# Patient Record
Sex: MALE | Race: ASIAN | NOT HISPANIC OR LATINO | Employment: FULL TIME
[De-identification: names, ages, dates, MRNs, and addresses within clinical notes are randomized per-mention and may not be internally consistent; named-entity substitution may affect disease eponyms.]

---

## 2017-10-16 ENCOUNTER — MYAURORA ACCOUNT LINK (OUTPATIENT)
Dept: OTHER | Age: 42
End: 2017-10-16

## 2017-10-16 ENCOUNTER — PRIOR ORIGINAL RECORDS (OUTPATIENT)
Dept: OTHER | Age: 42
End: 2017-10-16

## 2017-10-27 ENCOUNTER — PRIOR ORIGINAL RECORDS (OUTPATIENT)
Dept: OTHER | Age: 42
End: 2017-10-27

## 2017-11-01 ENCOUNTER — PRIOR ORIGINAL RECORDS (OUTPATIENT)
Dept: OTHER | Age: 42
End: 2017-11-01

## 2017-11-01 LAB
ALT (SGPT): 31 U/L
AST (SGOT): 22 U/L
CHOLESTEROL, TOTAL: 150 MG/DL
GLUCOSE: 2.6 MG/DL
HDL CHOLESTEROL: 57 MG/DL
LDL CHOLESTEROL: 61 MG/DL
NON-HDL CHOLESTEROL: 93 MG/DL
TRIGLYCERIDES: 162 MG/DL

## 2017-11-28 ENCOUNTER — PRIOR ORIGINAL RECORDS (OUTPATIENT)
Dept: OTHER | Age: 42
End: 2017-11-28

## 2018-01-25 ENCOUNTER — PRIOR ORIGINAL RECORDS (OUTPATIENT)
Dept: OTHER | Age: 43
End: 2018-01-25

## 2018-10-10 ENCOUNTER — OFFICE VISIT (OUTPATIENT)
Dept: FAMILY MEDICINE CLINIC | Facility: CLINIC | Age: 43
End: 2018-10-10
Payer: COMMERCIAL

## 2018-10-10 VITALS
OXYGEN SATURATION: 99 % | TEMPERATURE: 98 F | HEIGHT: 67 IN | BODY MASS INDEX: 40.81 KG/M2 | SYSTOLIC BLOOD PRESSURE: 118 MMHG | RESPIRATION RATE: 16 BRPM | WEIGHT: 260 LBS | DIASTOLIC BLOOD PRESSURE: 80 MMHG | HEART RATE: 81 BPM

## 2018-10-10 DIAGNOSIS — R09.89 CHEST CONGESTION: ICD-10-CM

## 2018-10-10 DIAGNOSIS — H65.191 OTHER ACUTE NONSUPPURATIVE OTITIS MEDIA OF RIGHT EAR, RECURRENCE NOT SPECIFIED: ICD-10-CM

## 2018-10-10 DIAGNOSIS — J01.10 ACUTE NON-RECURRENT FRONTAL SINUSITIS: Primary | ICD-10-CM

## 2018-10-10 PROCEDURE — 99202 OFFICE O/P NEW SF 15 MIN: CPT | Performed by: NURSE PRACTITIONER

## 2018-10-10 RX ORDER — AMOXICILLIN AND CLAVULANATE POTASSIUM 875; 125 MG/1; MG/1
1 TABLET, FILM COATED ORAL 2 TIMES DAILY
Qty: 20 TABLET | Refills: 0 | Status: SHIPPED | OUTPATIENT
Start: 2018-10-10 | End: 2018-10-20

## 2018-10-10 RX ORDER — METOPROLOL TARTRATE 100 MG/1
100 TABLET ORAL DAILY
COMMUNITY

## 2018-10-10 RX ORDER — EZETIMIBE 10 MG/1
1 TABLET ORAL DAILY
COMMUNITY

## 2018-10-10 RX ORDER — FLUTICASONE PROPIONATE 50 MCG
2 SPRAY, SUSPENSION (ML) NASAL DAILY
Qty: 1 BOTTLE | Refills: 1 | Status: SHIPPED | OUTPATIENT
Start: 2018-10-10 | End: 2018-10-24

## 2018-10-10 RX ORDER — LOSARTAN POTASSIUM 25 MG/1
TABLET ORAL DAILY
COMMUNITY

## 2018-10-10 RX ORDER — ATORVASTATIN CALCIUM 20 MG/1
1 TABLET, FILM COATED ORAL DAILY
COMMUNITY

## 2018-10-10 NOTE — PROGRESS NOTES
CHIEF COMPLAINT:   Patient presents with:  Cough: wet  Chest Congestion: runny nose, sinus congestion,s/s for 4-5 days.   OTC Sudafed/Tylenol was used      HPI:   Susan Vegas is a 39year old male who presents for cough and sinus congestion for 5 day /80   Pulse 81   Temp 97.7 °F (36.5 °C) (Oral)   Resp 16   Ht 67\"   Wt 260 lb   SpO2 99%   BMI 40.72 kg/m²   GENERAL: well developed, well nourished,in no apparent distress  SKIN: no rashes,no suspicious lesions  HEAD: atraumatic, normocephalic,  + -   Increase oral fluids to loosen and thin secretions, eat a nutritious diet  -   Tylenol or ibuprofen for pain as packet insert   -   Robitussin DM, Mucinex DM, or generic equivalent for cough as packet insert  -   Return to clinic if symptoms persist or You have an infection of the middle ear, the space behind the eardrum. This is also called acute otitis media (AOM). Sometimes it is caused by the common cold.  This is because congestion can block the internal passage (eustachian tube) that drains fluid fr The sinuses are air-filled spaces within the bones of the face. They connect to the inside of the nose. Sinusitis is an inflammation of the tissue that lines the sinuses. Sinusitis can occur during a cold.  It can also happen due to allergies to pollens and · Do not use nasal rinses or irrigation during an acute sinus infection, unless your healthcare provider tells you to. Rinsing may spread the infection to other areas in your sinuses.   · Use acetaminophen or ibuprofen to control pain, unless another pain m

## 2018-10-10 NOTE — PATIENT INSTRUCTIONS
-   Increase oral fluids to loosen and thin secretions, eat a nutritious diet  -   Tylenol or ibuprofen for pain as packet insert   -   Robitussin DM, Mucinex DM, or generic equivalent for cough as packet insert  -   Return to clinic if symptoms persist or (eustachian tube) that drains fluid from the middle ear. When the middle ear fills with fluid, bacteria can grow there and cause an infection. Oral antibiotics are used to treat this illness, not ear drops.  Symptoms usually start to improve within 1 to 2 d other particles in the air. Sinusitis can cause symptoms of sinus congestion and a feeling of fullness. A sinus infection causes fever, headache, and facial pain.  There is often green or yellow fluid draining from the nose or into the back of the throat (p liver or kidney disease or ever had a stomach ulcer, talk with your healthcare provider before using these medicines. (Aspirin should never be taken by anyone under age 25 who is ill with a fever. It may cause severe liver damage.)  · Don't smoke.  This can

## 2018-10-30 ENCOUNTER — MYAURORA ACCOUNT LINK (OUTPATIENT)
Dept: OTHER | Age: 43
End: 2018-10-30

## 2018-10-30 ENCOUNTER — PRIOR ORIGINAL RECORDS (OUTPATIENT)
Dept: OTHER | Age: 43
End: 2018-10-30

## 2018-11-21 ENCOUNTER — PRIOR ORIGINAL RECORDS (OUTPATIENT)
Dept: OTHER | Age: 43
End: 2018-11-21

## 2018-11-21 LAB
ALT (SGPT): 72 U/L
AST (SGOT): 44 U/L
CHOLESTEROL, TOTAL: 122 MG/DL
CHOLESTEROL, TOTAL: 122 MG/DL
HDL CHOLESTEROL: 51 MG/DL
HDL CHOLESTEROL: 51 MG/DL
LDL CHOLESTEROL: 40 MG/DL
LDL CHOLESTEROL: 40 MG/DL
NON-HDL CHOLESTEROL: 71 MG/DL
TOTAL CHOLESTEROL / HDL RATIO: 2.4 RATIO UN
TRIGLYCERIDES: 155 MG/DL
TRIGLYCERIDES: 155 MG/DL

## 2018-11-23 ENCOUNTER — PRIOR ORIGINAL RECORDS (OUTPATIENT)
Dept: OTHER | Age: 43
End: 2018-11-23

## 2019-02-28 VITALS
WEIGHT: 253 LBS | HEART RATE: 72 BPM | DIASTOLIC BLOOD PRESSURE: 84 MMHG | HEIGHT: 68 IN | RESPIRATION RATE: 16 BRPM | SYSTOLIC BLOOD PRESSURE: 130 MMHG | BODY MASS INDEX: 38.34 KG/M2

## 2019-02-28 VITALS
RESPIRATION RATE: 16 BRPM | DIASTOLIC BLOOD PRESSURE: 80 MMHG | HEART RATE: 72 BPM | BODY MASS INDEX: 39.4 KG/M2 | HEIGHT: 68 IN | SYSTOLIC BLOOD PRESSURE: 120 MMHG | WEIGHT: 260 LBS

## 2019-04-16 RX ORDER — LOSARTAN POTASSIUM 25 MG/1
TABLET ORAL
COMMUNITY
Start: 2019-02-21 | End: 2020-01-13 | Stop reason: SDUPTHER

## 2019-04-16 RX ORDER — ATORVASTATIN CALCIUM 40 MG/1
TABLET, FILM COATED ORAL
COMMUNITY
Start: 2019-02-21 | End: 2020-01-13 | Stop reason: SDUPTHER

## 2019-04-16 RX ORDER — EZETIMIBE 10 MG/1
TABLET ORAL
COMMUNITY
Start: 2018-03-08 | End: 2020-01-13 | Stop reason: SDUPTHER

## 2019-04-16 RX ORDER — METOPROLOL SUCCINATE 100 MG/1
TABLET, EXTENDED RELEASE ORAL
COMMUNITY
Start: 2018-03-08 | End: 2020-01-13 | Stop reason: SDUPTHER

## 2019-08-21 LAB
ABSOLUTE IMMATURE GRANULOCYTES (OFFPRE24): NORMAL
ALBUMIN SERPL-MCNC: 4.7 G/DL
ALBUMIN/GLOB SERPL: NORMAL {RATIO}
ALP SERPL-CCNC: 28 U/L
ALT SERPL-CCNC: 23 U/L
ANION GAP SERPL CALC-SCNC: NORMAL MMOL/L
AST SERPL-CCNC: 20 U/L
BASO+EOS+MONOS # BLD: NORMAL 10*3/UL
BASO+EOS+MONOS NFR BLD: NORMAL %
BASOPHILS # BLD: NORMAL 10*3/UL
BASOPHILS NFR BLD: NORMAL %
BILIRUB SERPL-MCNC: 1.2 MG/DL
BUN SERPL-MCNC: 14 MG/DL
BUN/CREAT SERPL: NORMAL
CALCIUM SERPL-MCNC: 9.3 MG/DL
CHLORIDE SERPL-SCNC: 100 MMOL/L
CHOLEST SERPL-MCNC: 138 MG/DL
CHOLEST/HDLC SERPL: 2.3 {RATIO}
CO2 SERPL-SCNC: NORMAL MMOL/L
CREAT SERPL-MCNC: 1 MG/DL
DIFFERENTIAL METHOD BLD: NORMAL
EOSINOPHIL # BLD: NORMAL 10*3/UL
EOSINOPHIL NFR BLD: NORMAL %
ERYTHROCYTE [DISTWIDTH] IN BLOOD: NORMAL %
GLOBULIN SER-MCNC: NORMAL G/DL
GLUCOSE SERPL-MCNC: 114 MG/DL
HCT VFR BLD CALC: 48.5 %
HDLC SERPL-MCNC: 59 MG/DL
HGB BLD-MCNC: 15.7 G/DL
IMMATURE GRANULOCYTES (OFFPRE25): NORMAL
LDLC SERPL CALC-MCNC: 47 MG/DL
LENGTH OF FAST TIME PATIENT: NORMAL H
LENGTH OF FAST TIME PATIENT: NORMAL H
LYMPHOCYTES # BLD: NORMAL 10*3/UL
LYMPHOCYTES NFR BLD: NORMAL %
MCH RBC QN AUTO: 29 PG
MCHC RBC AUTO-ENTMCNC: 32 G/DL
MCV RBC AUTO: 89 FL
MONOCYTES # BLD: NORMAL 10*3/UL
MONOCYTES NFR BLD: NORMAL %
MPV (OFFPRE2): NORMAL
NEUTROPHILS # BLD: NORMAL 10*3/UL
NEUTROPHILS NFR BLD: NORMAL %
NONHDLC SERPL-MCNC: 79 MG/DL
NRBC BLD MANUAL-RTO: NORMAL %
PLAT MORPH BLD: NORMAL
PLATELET # BLD: 199 10*3/UL
POTASSIUM SERPL-SCNC: 4.6 MMOL/L
PROT SERPL-MCNC: 7.5 G/DL
RBC # BLD: 5.5 10*6/UL
RBC MORPH BLD: NORMAL
SODIUM SERPL-SCNC: 140 MMOL/L
TRIGL SERPL-MCNC: 159 MG/DL
VLDLC SERPL CALC-MCNC: NORMAL MG/DL
WBC # BLD: 6.8 10*3/UL
WBC MORPH BLD: NORMAL

## 2019-10-24 ENCOUNTER — LAB SERVICES (OUTPATIENT)
Dept: CARDIOLOGY | Age: 44
End: 2019-10-24

## 2019-10-24 ENCOUNTER — TELEPHONE (OUTPATIENT)
Dept: CARDIOLOGY | Age: 44
End: 2019-10-24

## 2019-10-29 ENCOUNTER — OFFICE VISIT (OUTPATIENT)
Dept: CARDIOLOGY | Age: 44
End: 2019-10-29

## 2019-10-29 VITALS
WEIGHT: 237 LBS | HEART RATE: 68 BPM | BODY MASS INDEX: 37.2 KG/M2 | HEIGHT: 67 IN | DIASTOLIC BLOOD PRESSURE: 78 MMHG | SYSTOLIC BLOOD PRESSURE: 134 MMHG

## 2019-10-29 DIAGNOSIS — Z95.1 S/P CABG (CORONARY ARTERY BYPASS GRAFT): ICD-10-CM

## 2019-10-29 DIAGNOSIS — I25.10 CAD IN NATIVE ARTERY: Primary | ICD-10-CM

## 2019-10-29 DIAGNOSIS — G47.33 OBSTRUCTIVE SLEEP APNEA: ICD-10-CM

## 2019-10-29 DIAGNOSIS — E66.9 OBESITY (BMI 30-39.9): ICD-10-CM

## 2019-10-29 DIAGNOSIS — E78.2 HYPERLIPIDEMIA, MIXED: ICD-10-CM

## 2019-10-29 PROCEDURE — 99214 OFFICE O/P EST MOD 30 MIN: CPT | Performed by: INTERNAL MEDICINE

## 2019-10-29 SDOH — HEALTH STABILITY: PHYSICAL HEALTH: ON AVERAGE, HOW MANY DAYS PER WEEK DO YOU ENGAGE IN MODERATE TO STRENUOUS EXERCISE (LIKE A BRISK WALK)?: 4 DAYS

## 2019-10-29 SDOH — HEALTH STABILITY: PHYSICAL HEALTH: ON AVERAGE, HOW MANY MINUTES DO YOU ENGAGE IN EXERCISE AT THIS LEVEL?: 60 MIN

## 2019-10-29 ASSESSMENT — ENCOUNTER SYMPTOMS
WEIGHT LOSS: 0
SUSPICIOUS LESIONS: 0
HEMATOCHEZIA: 0
FEVER: 0
COUGH: 0
CHILLS: 0
BRUISES/BLEEDS EASILY: 0
ALLERGIC/IMMUNOLOGIC COMMENTS: NO NEW FOOD ALLERGIES
HEMOPTYSIS: 0
WEIGHT GAIN: 0

## 2019-10-29 ASSESSMENT — PATIENT HEALTH QUESTIONNAIRE - PHQ9
SUM OF ALL RESPONSES TO PHQ9 QUESTIONS 1 AND 2: 0
SUM OF ALL RESPONSES TO PHQ9 QUESTIONS 1 AND 2: 0
1. LITTLE INTEREST OR PLEASURE IN DOING THINGS: NOT AT ALL
2. FEELING DOWN, DEPRESSED OR HOPELESS: NOT AT ALL

## 2020-01-13 ENCOUNTER — TELEPHONE (OUTPATIENT)
Dept: CARDIOLOGY | Age: 45
End: 2020-01-13

## 2020-01-13 RX ORDER — METOPROLOL SUCCINATE 100 MG/1
100 TABLET, EXTENDED RELEASE ORAL AT BEDTIME
Qty: 90 TABLET | Refills: 3 | Status: SHIPPED | OUTPATIENT
Start: 2020-01-13 | End: 2020-02-06 | Stop reason: SDUPTHER

## 2020-01-13 RX ORDER — ATORVASTATIN CALCIUM 40 MG/1
TABLET, FILM COATED ORAL
Status: CANCELLED | OUTPATIENT
Start: 2020-01-13

## 2020-01-13 RX ORDER — LOSARTAN POTASSIUM 25 MG/1
25 TABLET ORAL DAILY
Qty: 90 TABLET | Refills: 3 | Status: SHIPPED | OUTPATIENT
Start: 2020-01-13 | End: 2020-02-06 | Stop reason: SDUPTHER

## 2020-01-13 RX ORDER — EZETIMIBE 10 MG/1
TABLET ORAL
Status: CANCELLED | OUTPATIENT
Start: 2020-01-13

## 2020-01-13 RX ORDER — LOSARTAN POTASSIUM 25 MG/1
TABLET ORAL
Status: CANCELLED | OUTPATIENT
Start: 2020-01-13

## 2020-01-13 RX ORDER — ATORVASTATIN CALCIUM 40 MG/1
40 TABLET, FILM COATED ORAL AT BEDTIME
Qty: 90 TABLET | Refills: 3 | Status: SHIPPED | OUTPATIENT
Start: 2020-01-13 | End: 2020-02-06 | Stop reason: SDUPTHER

## 2020-01-13 RX ORDER — METOPROLOL SUCCINATE 100 MG/1
TABLET, EXTENDED RELEASE ORAL
Status: CANCELLED | OUTPATIENT
Start: 2020-01-13

## 2020-01-13 RX ORDER — EZETIMIBE 10 MG/1
10 TABLET ORAL DAILY
Qty: 90 TABLET | Refills: 3 | Status: SHIPPED | OUTPATIENT
Start: 2020-01-13 | End: 2020-02-06 | Stop reason: SDUPTHER

## 2020-02-06 RX ORDER — LOSARTAN POTASSIUM 25 MG/1
25 TABLET ORAL DAILY
Qty: 90 TABLET | Refills: 3 | Status: SHIPPED | OUTPATIENT
Start: 2020-02-06 | End: 2020-10-01 | Stop reason: SDUPTHER

## 2020-02-06 RX ORDER — METOPROLOL SUCCINATE 100 MG/1
100 TABLET, EXTENDED RELEASE ORAL AT BEDTIME
Qty: 90 TABLET | Refills: 3 | Status: SHIPPED | OUTPATIENT
Start: 2020-02-06 | End: 2020-10-01 | Stop reason: SDUPTHER

## 2020-02-06 RX ORDER — ATORVASTATIN CALCIUM 40 MG/1
40 TABLET, FILM COATED ORAL AT BEDTIME
Qty: 90 TABLET | Refills: 3 | Status: SHIPPED | OUTPATIENT
Start: 2020-02-06 | End: 2020-10-01 | Stop reason: SDUPTHER

## 2020-02-06 RX ORDER — EZETIMIBE 10 MG/1
10 TABLET ORAL DAILY
Qty: 90 TABLET | Refills: 3 | Status: SHIPPED | OUTPATIENT
Start: 2020-02-06 | End: 2020-10-01 | Stop reason: SDUPTHER

## 2020-09-23 ENCOUNTER — CLINICAL ABSTRACT (OUTPATIENT)
Dept: CARDIOLOGY | Age: 45
End: 2020-09-23

## 2020-09-23 LAB
ALBUMIN SERPL-MCNC: 5 G/DL
ALP SERPL-CCNC: 32 U/L
ALT SERPL-CCNC: 44 UNITS/L
ANION GAP SERPL CALC-SCNC: 15 MMOL/L
AST SERPL-CCNC: 29 UNITS/L
CALCIUM SERPL-MCNC: 9.9 MG/DL
CHLORIDE SERPL-SCNC: 102 MMOL/L
CHOLEST SERPL-MCNC: 151 MG/DL
CO2 SERPL-SCNC: 24 MMOL/L
CREAT SERPL-MCNC: 117 MG/DL
GLUCOSE SERPL-MCNC: 12 MG/DL
HCT VFR BLD CALC: 45.7 %
HDLC SERPL-MCNC: 62 MG/DL
HGB BLD-MCNC: 15.3 G/DL
LDLC SERPL CALC-MCNC: 55 MG/DL
PLATELET # BLD: 169 K/MCL
POTASSIUM SERPL-SCNC: 4.6 MMOL/L
PROT SERPL-MCNC: 7.2 G/DL
RBC # BLD: 5 10*6/UL
SODIUM SERPL-SCNC: 141 MMOL/L
TRIGL SERPL-MCNC: 172 MG/DL
TSH SERPL-ACNC: 2.13 MCUNITS/ML
WBC # BLD: 6.1 K/MCL

## 2020-09-24 ENCOUNTER — CLINICAL ABSTRACT (OUTPATIENT)
Dept: CARDIOLOGY | Age: 45
End: 2020-09-24

## 2020-10-01 ENCOUNTER — OFFICE VISIT (OUTPATIENT)
Dept: CARDIOLOGY | Age: 45
End: 2020-10-01

## 2020-10-01 VITALS
WEIGHT: 241 LBS | DIASTOLIC BLOOD PRESSURE: 80 MMHG | BODY MASS INDEX: 37.83 KG/M2 | SYSTOLIC BLOOD PRESSURE: 130 MMHG | HEART RATE: 68 BPM | HEIGHT: 67 IN

## 2020-10-01 DIAGNOSIS — E78.2 HYPERLIPIDEMIA, MIXED: ICD-10-CM

## 2020-10-01 DIAGNOSIS — E66.9 OBESITY (BMI 30-39.9): ICD-10-CM

## 2020-10-01 DIAGNOSIS — I10 ESSENTIAL HYPERTENSION: ICD-10-CM

## 2020-10-01 DIAGNOSIS — Z95.1 S/P CABG (CORONARY ARTERY BYPASS GRAFT): ICD-10-CM

## 2020-10-01 DIAGNOSIS — I25.10 CAD IN NATIVE ARTERY: ICD-10-CM

## 2020-10-01 DIAGNOSIS — G47.33 OBSTRUCTIVE SLEEP APNEA: Primary | ICD-10-CM

## 2020-10-01 PROCEDURE — 3079F DIAST BP 80-89 MM HG: CPT | Performed by: INTERNAL MEDICINE

## 2020-10-01 PROCEDURE — 99215 OFFICE O/P EST HI 40 MIN: CPT | Performed by: INTERNAL MEDICINE

## 2020-10-01 PROCEDURE — 3075F SYST BP GE 130 - 139MM HG: CPT | Performed by: INTERNAL MEDICINE

## 2020-10-01 RX ORDER — ATORVASTATIN CALCIUM 40 MG/1
40 TABLET, FILM COATED ORAL AT BEDTIME
Qty: 90 TABLET | Refills: 3 | Status: SHIPPED | OUTPATIENT
Start: 2020-10-01 | End: 2021-10-07 | Stop reason: SDUPTHER

## 2020-10-01 RX ORDER — EZETIMIBE 10 MG/1
10 TABLET ORAL DAILY
Qty: 90 TABLET | Refills: 3 | Status: SHIPPED | OUTPATIENT
Start: 2020-10-01 | End: 2021-10-07 | Stop reason: SDUPTHER

## 2020-10-01 RX ORDER — METOPROLOL SUCCINATE 100 MG/1
100 TABLET, EXTENDED RELEASE ORAL AT BEDTIME
Qty: 90 TABLET | Refills: 3 | Status: SHIPPED | OUTPATIENT
Start: 2020-10-01 | End: 2021-10-07 | Stop reason: SDUPTHER

## 2020-10-01 RX ORDER — LOSARTAN POTASSIUM 25 MG/1
25 TABLET ORAL DAILY
Qty: 90 TABLET | Refills: 3 | Status: SHIPPED | OUTPATIENT
Start: 2020-10-01 | End: 2021-10-07 | Stop reason: SDUPTHER

## 2020-10-01 SDOH — HEALTH STABILITY: PHYSICAL HEALTH: ON AVERAGE, HOW MANY DAYS PER WEEK DO YOU ENGAGE IN MODERATE TO STRENUOUS EXERCISE (LIKE A BRISK WALK)?: 4 DAYS

## 2020-10-01 SDOH — HEALTH STABILITY: PHYSICAL HEALTH: ON AVERAGE, HOW MANY MINUTES DO YOU ENGAGE IN EXERCISE AT THIS LEVEL?: 60 MIN

## 2020-10-01 ASSESSMENT — ENCOUNTER SYMPTOMS
SUSPICIOUS LESIONS: 0
ALLERGIC/IMMUNOLOGIC COMMENTS: NO NEW FOOD ALLERGIES
CHILLS: 0
HEMATOCHEZIA: 0
BRUISES/BLEEDS EASILY: 0
WEIGHT LOSS: 0
FEVER: 0
HEMOPTYSIS: 0
WEIGHT GAIN: 0
COUGH: 0

## 2020-10-01 ASSESSMENT — PATIENT HEALTH QUESTIONNAIRE - PHQ9
SUM OF ALL RESPONSES TO PHQ9 QUESTIONS 1 AND 2: 0
CLINICAL INTERPRETATION OF PHQ9 SCORE: NO FURTHER SCREENING NEEDED
2. FEELING DOWN, DEPRESSED OR HOPELESS: NOT AT ALL
SUM OF ALL RESPONSES TO PHQ9 QUESTIONS 1 AND 2: 0
1. LITTLE INTEREST OR PLEASURE IN DOING THINGS: NOT AT ALL
CLINICAL INTERPRETATION OF PHQ2 SCORE: NO FURTHER SCREENING NEEDED

## 2020-11-03 ENCOUNTER — APPOINTMENT (OUTPATIENT)
Dept: CARDIOLOGY | Age: 45
End: 2020-11-03

## 2021-10-05 ENCOUNTER — LAB SERVICES (OUTPATIENT)
Dept: FAMILY MEDICINE | Age: 46
End: 2021-10-05

## 2021-10-05 DIAGNOSIS — E78.2 HYPERLIPIDEMIA, MIXED: Primary | ICD-10-CM

## 2021-10-05 LAB
ALT SERPL W/O P-5'-P-CCNC: 70 U/L (ref 5–49)
AST SERPL-CCNC: 55 U/L (ref 14–43)
CHOLEST SERPL-MCNC: 144 MG/DL (ref 140–200)
HDLC SERPL-MCNC: 65 MG/DL
LDLC SERPL CALC-MCNC: 52 MG/DL (ref 30–100)
TRIGL SERPL-MCNC: 134 MG/DL (ref 0–200)

## 2021-10-05 PROCEDURE — 80061 LIPID PANEL: CPT | Performed by: INTERNAL MEDICINE

## 2021-10-05 PROCEDURE — 84460 ALANINE AMINO (ALT) (SGPT): CPT | Performed by: INTERNAL MEDICINE

## 2021-10-05 PROCEDURE — 84450 TRANSFERASE (AST) (SGOT): CPT | Performed by: INTERNAL MEDICINE

## 2021-10-05 PROCEDURE — 36415 COLL VENOUS BLD VENIPUNCTURE: CPT | Performed by: INTERNAL MEDICINE

## 2021-10-07 ENCOUNTER — OFFICE VISIT (OUTPATIENT)
Dept: CARDIOLOGY | Age: 46
End: 2021-10-07

## 2021-10-07 VITALS
SYSTOLIC BLOOD PRESSURE: 126 MMHG | WEIGHT: 227 LBS | DIASTOLIC BLOOD PRESSURE: 80 MMHG | BODY MASS INDEX: 35.63 KG/M2 | HEIGHT: 67 IN | HEART RATE: 68 BPM

## 2021-10-07 DIAGNOSIS — E66.9 OBESITY (BMI 30-39.9): ICD-10-CM

## 2021-10-07 DIAGNOSIS — I25.10 CAD IN NATIVE ARTERY: Primary | ICD-10-CM

## 2021-10-07 DIAGNOSIS — E78.2 HYPERLIPIDEMIA, MIXED: ICD-10-CM

## 2021-10-07 DIAGNOSIS — I10 ESSENTIAL HYPERTENSION: ICD-10-CM

## 2021-10-07 DIAGNOSIS — R79.89 ELEVATED LIVER FUNCTION TESTS: ICD-10-CM

## 2021-10-07 DIAGNOSIS — G47.33 OBSTRUCTIVE SLEEP APNEA: ICD-10-CM

## 2021-10-07 DIAGNOSIS — Z95.1 S/P CABG (CORONARY ARTERY BYPASS GRAFT): ICD-10-CM

## 2021-10-07 DIAGNOSIS — R79.89 ELEVATED LFTS: ICD-10-CM

## 2021-10-07 PROCEDURE — 99215 OFFICE O/P EST HI 40 MIN: CPT | Performed by: INTERNAL MEDICINE

## 2021-10-07 PROCEDURE — 3079F DIAST BP 80-89 MM HG: CPT | Performed by: INTERNAL MEDICINE

## 2021-10-07 PROCEDURE — 3074F SYST BP LT 130 MM HG: CPT | Performed by: INTERNAL MEDICINE

## 2021-10-07 RX ORDER — EZETIMIBE 10 MG/1
10 TABLET ORAL DAILY
Qty: 90 TABLET | Refills: 3 | Status: SHIPPED | OUTPATIENT
Start: 2021-10-07

## 2021-10-07 RX ORDER — ATORVASTATIN CALCIUM 40 MG/1
40 TABLET, FILM COATED ORAL AT BEDTIME
Qty: 90 TABLET | Refills: 3 | Status: SHIPPED | OUTPATIENT
Start: 2021-10-07

## 2021-10-07 RX ORDER — METOPROLOL SUCCINATE 100 MG/1
100 TABLET, EXTENDED RELEASE ORAL AT BEDTIME
Qty: 90 TABLET | Refills: 3 | Status: SHIPPED | OUTPATIENT
Start: 2021-10-07

## 2021-10-07 RX ORDER — LOSARTAN POTASSIUM 25 MG/1
25 TABLET ORAL DAILY
Qty: 90 TABLET | Refills: 3 | Status: SHIPPED | OUTPATIENT
Start: 2021-10-07 | End: 2022-11-15 | Stop reason: ALTCHOICE

## 2021-10-07 SDOH — HEALTH STABILITY: PHYSICAL HEALTH: ON AVERAGE, HOW MANY DAYS PER WEEK DO YOU ENGAGE IN MODERATE TO STRENUOUS EXERCISE (LIKE A BRISK WALK)?: 3 DAYS

## 2021-10-07 ASSESSMENT — PATIENT HEALTH QUESTIONNAIRE - PHQ9
CLINICAL INTERPRETATION OF PHQ2 SCORE: NO FURTHER SCREENING NEEDED
SUM OF ALL RESPONSES TO PHQ9 QUESTIONS 1 AND 2: 0
2. FEELING DOWN, DEPRESSED OR HOPELESS: NOT AT ALL
SUM OF ALL RESPONSES TO PHQ9 QUESTIONS 1 AND 2: 0
CLINICAL INTERPRETATION OF PHQ9 SCORE: NO FURTHER SCREENING NEEDED
1. LITTLE INTEREST OR PLEASURE IN DOING THINGS: NOT AT ALL

## 2021-11-04 ENCOUNTER — LAB SERVICES (OUTPATIENT)
Dept: FAMILY MEDICINE | Age: 46
End: 2021-11-04

## 2021-11-04 DIAGNOSIS — R79.89 ELEVATED LIVER FUNCTION TESTS: ICD-10-CM

## 2021-11-04 LAB
ALBUMIN SERPL-MCNC: 4.8 G/DL (ref 3.6–5.1)
ALP SERPL-CCNC: 38 U/L (ref 45–115)
ALT SERPL W/O P-5'-P-CCNC: 41 U/L (ref 5–49)
AST SERPL-CCNC: 45 U/L (ref 14–43)
BILIRUB SERPL-MCNC: 1 MG/DL (ref 0–1.3)
BUN SERPL-MCNC: 17 MG/DL (ref 6–27)
CALCIUM SERPL-MCNC: 10.1 MG/DL (ref 8.6–10.6)
CHLORIDE SERPL-SCNC: 101 MMOL/L (ref 96–107)
CO2 SERPL-SCNC: 29 MMOL/L (ref 22–32)
CREAT SERPL-MCNC: 0.9 MG/DL (ref 0.6–1.6)
GFR SERPL CREATININE-BSD FRML MDRD: >60 ML/MIN/{1.73M2}
GFR SERPL CREATININE-BSD FRML MDRD: >60 ML/MIN/{1.73M2}
GLUCOSE SERPL-MCNC: 95 MG/DL (ref 70–200)
POTASSIUM SERPL-SCNC: 4.3 MMOL/L (ref 3.5–5.3)
PROT SERPL-MCNC: 7.7 G/DL (ref 6.4–8.5)
SODIUM SERPL-SCNC: 138 MMOL/L (ref 136–146)

## 2021-11-04 PROCEDURE — 80053 COMPREHEN METABOLIC PANEL: CPT | Performed by: INTERNAL MEDICINE

## 2021-11-04 PROCEDURE — 36415 COLL VENOUS BLD VENIPUNCTURE: CPT | Performed by: INTERNAL MEDICINE

## 2021-11-05 ENCOUNTER — TELEPHONE (OUTPATIENT)
Dept: CARDIOLOGY | Age: 46
End: 2021-11-05

## 2022-01-07 ENCOUNTER — OFFICE VISIT (OUTPATIENT)
Dept: CARDIOLOGY | Age: 47
End: 2022-01-07

## 2022-01-07 VITALS
DIASTOLIC BLOOD PRESSURE: 80 MMHG | HEIGHT: 67 IN | WEIGHT: 235 LBS | SYSTOLIC BLOOD PRESSURE: 140 MMHG | BODY MASS INDEX: 36.88 KG/M2 | HEART RATE: 68 BPM

## 2022-01-07 DIAGNOSIS — Z95.1 S/P CABG (CORONARY ARTERY BYPASS GRAFT): ICD-10-CM

## 2022-01-07 DIAGNOSIS — G47.33 OBSTRUCTIVE SLEEP APNEA: ICD-10-CM

## 2022-01-07 DIAGNOSIS — I10 ESSENTIAL HYPERTENSION: ICD-10-CM

## 2022-01-07 DIAGNOSIS — I25.10 CAD IN NATIVE ARTERY: ICD-10-CM

## 2022-01-07 DIAGNOSIS — R79.89 ELEVATED LFTS: Primary | ICD-10-CM

## 2022-01-07 DIAGNOSIS — E66.9 OBESITY (BMI 30-39.9): ICD-10-CM

## 2022-01-07 DIAGNOSIS — E78.2 HYPERLIPIDEMIA, MIXED: ICD-10-CM

## 2022-01-07 PROCEDURE — 99215 OFFICE O/P EST HI 40 MIN: CPT | Performed by: INTERNAL MEDICINE

## 2022-01-07 PROCEDURE — 3077F SYST BP >= 140 MM HG: CPT | Performed by: INTERNAL MEDICINE

## 2022-01-07 PROCEDURE — 3079F DIAST BP 80-89 MM HG: CPT | Performed by: INTERNAL MEDICINE

## 2022-01-07 SDOH — HEALTH STABILITY: PHYSICAL HEALTH: ON AVERAGE, HOW MANY DAYS PER WEEK DO YOU ENGAGE IN MODERATE TO STRENUOUS EXERCISE (LIKE A BRISK WALK)?: 3 DAYS

## 2022-01-07 SDOH — HEALTH STABILITY: PHYSICAL HEALTH: ON AVERAGE, HOW MANY MINUTES DO YOU ENGAGE IN EXERCISE AT THIS LEVEL?: 60 MIN

## 2022-01-07 ASSESSMENT — PATIENT HEALTH QUESTIONNAIRE - PHQ9
CLINICAL INTERPRETATION OF PHQ2 SCORE: NO FURTHER SCREENING NEEDED
SUM OF ALL RESPONSES TO PHQ9 QUESTIONS 1 AND 2: 0
SUM OF ALL RESPONSES TO PHQ9 QUESTIONS 1 AND 2: 0
2. FEELING DOWN, DEPRESSED OR HOPELESS: NOT AT ALL
1. LITTLE INTEREST OR PLEASURE IN DOING THINGS: NOT AT ALL

## 2022-06-06 ENCOUNTER — LAB SERVICES (OUTPATIENT)
Dept: FAMILY MEDICINE | Age: 47
End: 2022-06-06

## 2022-06-06 ENCOUNTER — TELEPHONE (OUTPATIENT)
Dept: CARDIOLOGY | Age: 47
End: 2022-06-06

## 2022-06-06 DIAGNOSIS — Z95.1 S/P CABG (CORONARY ARTERY BYPASS GRAFT): ICD-10-CM

## 2022-06-06 DIAGNOSIS — I25.10 CAD IN NATIVE ARTERY: ICD-10-CM

## 2022-06-06 DIAGNOSIS — R79.89 ELEVATED LFTS: Primary | ICD-10-CM

## 2022-06-06 DIAGNOSIS — R79.89 ELEVATED LIVER FUNCTION TESTS: ICD-10-CM

## 2022-06-06 DIAGNOSIS — E78.2 HYPERLIPIDEMIA, MIXED: ICD-10-CM

## 2022-06-06 LAB
ALT SERPL W/O P-5'-P-CCNC: 33 U/L (ref 5–49)
AST SERPL-CCNC: 33 U/L (ref 14–43)
CHOLEST SERPL-MCNC: 175 MG/DL (ref 140–200)
HDLC SERPL-MCNC: 68 MG/DL
LDLC SERPL DIRECT ASSAY-MCNC: 85 MG/DL (ref 30–100)
TRIGL SERPL-MCNC: 293 MG/DL (ref 0–200)

## 2022-06-06 PROCEDURE — 84450 TRANSFERASE (AST) (SGOT): CPT | Performed by: INTERNAL MEDICINE

## 2022-06-06 PROCEDURE — 36415 COLL VENOUS BLD VENIPUNCTURE: CPT | Performed by: INTERNAL MEDICINE

## 2022-06-06 PROCEDURE — 84460 ALANINE AMINO (ALT) (SGPT): CPT | Performed by: INTERNAL MEDICINE

## 2022-06-06 PROCEDURE — 83721 ASSAY OF BLOOD LIPOPROTEIN: CPT | Performed by: INTERNAL MEDICINE

## 2022-06-06 PROCEDURE — 80061 LIPID PANEL: CPT | Performed by: INTERNAL MEDICINE

## 2022-06-09 ENCOUNTER — APPOINTMENT (OUTPATIENT)
Dept: CARDIOLOGY | Age: 47
End: 2022-06-09

## 2022-06-10 ENCOUNTER — OFFICE VISIT (OUTPATIENT)
Dept: CARDIOLOGY | Age: 47
End: 2022-06-10

## 2022-06-10 VITALS
WEIGHT: 244 LBS | SYSTOLIC BLOOD PRESSURE: 130 MMHG | HEART RATE: 80 BPM | DIASTOLIC BLOOD PRESSURE: 74 MMHG | BODY MASS INDEX: 38.3 KG/M2 | HEIGHT: 67 IN

## 2022-06-10 DIAGNOSIS — I25.10 CAD IN NATIVE ARTERY: ICD-10-CM

## 2022-06-10 DIAGNOSIS — Z95.1 S/P CABG (CORONARY ARTERY BYPASS GRAFT): ICD-10-CM

## 2022-06-10 DIAGNOSIS — G47.33 OBSTRUCTIVE SLEEP APNEA: ICD-10-CM

## 2022-06-10 DIAGNOSIS — E66.9 OBESITY (BMI 30-39.9): ICD-10-CM

## 2022-06-10 DIAGNOSIS — E78.2 HYPERLIPIDEMIA, MIXED: Primary | ICD-10-CM

## 2022-06-10 PROCEDURE — 99215 OFFICE O/P EST HI 40 MIN: CPT | Performed by: INTERNAL MEDICINE

## 2022-06-10 SDOH — HEALTH STABILITY: MENTAL HEALTH: DEPRESSION SCREENING SCORE: 0

## 2022-06-10 SDOH — HEALTH STABILITY: MENTAL HEALTH: LITTLE INTEREST OR PLEASURE IN ACTIVITY?: NOT AT ALL

## 2022-06-10 SDOH — HEALTH STABILITY: MENTAL HEALTH: PHQ2 INTERPRETATION: NO FURTHER SCREENING NEEDED

## 2022-06-10 SDOH — HEALTH STABILITY: MENTAL HEALTH: FEELING DOWN, DEPRESSED OR HOPELESS?: NOT AT ALL

## 2022-06-10 SDOH — HEALTH STABILITY: PHYSICAL HEALTH: ON AVERAGE, HOW MANY DAYS PER WEEK DO YOU ENGAGE IN MODERATE TO STRENUOUS EXERCISE (LIKE A BRISK WALK)?: 3 DAYS

## 2022-06-10 ASSESSMENT — PATIENT HEALTH QUESTIONNAIRE - PHQ9: SUM OF ALL RESPONSES TO PHQ9 QUESTIONS 1 AND 2: 0

## 2022-10-03 ENCOUNTER — TELEPHONE (OUTPATIENT)
Dept: CARDIOLOGY | Age: 47
End: 2022-10-03

## 2022-10-05 ENCOUNTER — TELEPHONE (OUTPATIENT)
Dept: CARDIOLOGY | Age: 47
End: 2022-10-05

## 2022-10-10 ENCOUNTER — TELEPHONE (OUTPATIENT)
Dept: CARDIOLOGY | Age: 47
End: 2022-10-10

## 2022-12-13 ENCOUNTER — TELEPHONE (OUTPATIENT)
Dept: CARDIOLOGY | Age: 47
End: 2022-12-13

## 2022-12-13 DIAGNOSIS — Z95.1 S/P CABG (CORONARY ARTERY BYPASS GRAFT): ICD-10-CM

## 2022-12-13 DIAGNOSIS — I25.10 CAD IN NATIVE ARTERY: Primary | ICD-10-CM

## 2022-12-13 DIAGNOSIS — E78.2 HYPERLIPIDEMIA, MIXED: ICD-10-CM

## 2022-12-14 ENCOUNTER — LAB SERVICES (OUTPATIENT)
Dept: FAMILY MEDICINE | Age: 47
End: 2022-12-14

## 2022-12-14 DIAGNOSIS — Z95.1 S/P CABG (CORONARY ARTERY BYPASS GRAFT): ICD-10-CM

## 2022-12-14 DIAGNOSIS — I25.10 CAD IN NATIVE ARTERY: ICD-10-CM

## 2022-12-14 DIAGNOSIS — E78.2 HYPERLIPIDEMIA, MIXED: ICD-10-CM

## 2022-12-14 LAB
ALT SERPL-CCNC: 40 UNITS/L
AST SERPL-CCNC: 27 UNITS/L
CHOLEST SERPL-MCNC: 161 MG/DL
CHOLEST/HDLC SERPL: 2.4 {RATIO}
FASTING DURATION TIME PATIENT: ABNORMAL H
HDLC SERPL-MCNC: 66 MG/DL
LDLC SERPL CALC-MCNC: 48 MG/DL
NONHDLC SERPL-MCNC: 95 MG/DL
TRIGL SERPL-MCNC: 233 MG/DL

## 2022-12-14 PROCEDURE — 84450 TRANSFERASE (AST) (SGOT): CPT | Performed by: INTERNAL MEDICINE

## 2022-12-14 PROCEDURE — 80061 LIPID PANEL: CPT | Performed by: INTERNAL MEDICINE

## 2022-12-14 PROCEDURE — 36415 COLL VENOUS BLD VENIPUNCTURE: CPT | Performed by: INTERNAL MEDICINE

## 2022-12-14 PROCEDURE — 84460 ALANINE AMINO (ALT) (SGPT): CPT | Performed by: INTERNAL MEDICINE

## 2022-12-15 ENCOUNTER — APPOINTMENT (OUTPATIENT)
Dept: CARDIOLOGY | Age: 47
End: 2022-12-15

## 2022-12-15 ENCOUNTER — OFFICE VISIT (OUTPATIENT)
Dept: CARDIOLOGY | Age: 47
End: 2022-12-15

## 2022-12-15 VITALS
BODY MASS INDEX: 38.77 KG/M2 | HEIGHT: 67 IN | WEIGHT: 247 LBS | SYSTOLIC BLOOD PRESSURE: 140 MMHG | HEART RATE: 80 BPM | DIASTOLIC BLOOD PRESSURE: 82 MMHG

## 2022-12-15 DIAGNOSIS — Z95.1 S/P CABG (CORONARY ARTERY BYPASS GRAFT): ICD-10-CM

## 2022-12-15 DIAGNOSIS — R79.89 ELEVATED LIVER FUNCTION TESTS: ICD-10-CM

## 2022-12-15 DIAGNOSIS — E66.9 OBESITY (BMI 30-39.9): ICD-10-CM

## 2022-12-15 DIAGNOSIS — R79.89 ELEVATED LFTS: ICD-10-CM

## 2022-12-15 DIAGNOSIS — I25.10 CAD IN NATIVE ARTERY: Primary | ICD-10-CM

## 2022-12-15 DIAGNOSIS — G47.33 OBSTRUCTIVE SLEEP APNEA: ICD-10-CM

## 2022-12-15 DIAGNOSIS — I10 ESSENTIAL HYPERTENSION: ICD-10-CM

## 2022-12-15 DIAGNOSIS — E78.2 HYPERLIPIDEMIA, MIXED: ICD-10-CM

## 2022-12-15 PROCEDURE — 99215 OFFICE O/P EST HI 40 MIN: CPT | Performed by: INTERNAL MEDICINE

## 2022-12-15 PROCEDURE — 3077F SYST BP >= 140 MM HG: CPT | Performed by: INTERNAL MEDICINE

## 2022-12-15 PROCEDURE — 3079F DIAST BP 80-89 MM HG: CPT | Performed by: INTERNAL MEDICINE

## 2022-12-15 RX ORDER — AMLODIPINE BESYLATE 5 MG/1
5 TABLET ORAL DAILY
COMMUNITY
Start: 2022-12-01 | End: 2022-12-15 | Stop reason: DRUGHIGH

## 2022-12-15 RX ORDER — AMLODIPINE BESYLATE 10 MG/1
10 TABLET ORAL DAILY
Qty: 90 TABLET | Refills: 3 | Status: SHIPPED | OUTPATIENT
Start: 2022-12-15

## 2023-03-16 ENCOUNTER — OFFICE VISIT (OUTPATIENT)
Dept: CARDIOLOGY | Age: 48
End: 2023-03-16

## 2023-03-16 VITALS
HEIGHT: 67 IN | OXYGEN SATURATION: 99 % | DIASTOLIC BLOOD PRESSURE: 85 MMHG | HEART RATE: 75 BPM | BODY MASS INDEX: 39.24 KG/M2 | RESPIRATION RATE: 16 BRPM | WEIGHT: 250 LBS | SYSTOLIC BLOOD PRESSURE: 131 MMHG

## 2023-03-16 DIAGNOSIS — Z95.1 S/P CABG (CORONARY ARTERY BYPASS GRAFT): ICD-10-CM

## 2023-03-16 DIAGNOSIS — I25.10 CAD IN NATIVE ARTERY: Primary | ICD-10-CM

## 2023-03-16 DIAGNOSIS — E66.9 OBESITY (BMI 30-39.9): ICD-10-CM

## 2023-03-16 DIAGNOSIS — I10 ESSENTIAL HYPERTENSION: ICD-10-CM

## 2023-03-16 DIAGNOSIS — G47.33 OBSTRUCTIVE SLEEP APNEA: ICD-10-CM

## 2023-03-16 DIAGNOSIS — E78.2 HYPERLIPIDEMIA, MIXED: ICD-10-CM

## 2023-03-16 PROCEDURE — 3079F DIAST BP 80-89 MM HG: CPT | Performed by: INTERNAL MEDICINE

## 2023-03-16 PROCEDURE — 3075F SYST BP GE 130 - 139MM HG: CPT | Performed by: INTERNAL MEDICINE

## 2023-03-16 PROCEDURE — 99215 OFFICE O/P EST HI 40 MIN: CPT | Performed by: INTERNAL MEDICINE

## 2023-03-16 SDOH — HEALTH STABILITY: MENTAL HEALTH: DEPRESSION SCREENING SCORE: 0

## 2023-03-16 SDOH — HEALTH STABILITY: PHYSICAL HEALTH: ON AVERAGE, HOW MANY MINUTES DO YOU ENGAGE IN EXERCISE AT THIS LEVEL?: 40 MIN

## 2023-03-16 SDOH — HEALTH STABILITY: MENTAL HEALTH: PHQ2 INTERPRETATION: NO FURTHER SCREENING NEEDED

## 2023-03-16 SDOH — HEALTH STABILITY: MENTAL HEALTH: FEELING DOWN, DEPRESSED OR HOPELESS?: NOT AT ALL

## 2023-03-16 SDOH — HEALTH STABILITY: PHYSICAL HEALTH: ON AVERAGE, HOW MANY DAYS PER WEEK DO YOU ENGAGE IN MODERATE TO STRENUOUS EXERCISE (LIKE A BRISK WALK)?: 3 DAYS

## 2023-03-16 SDOH — HEALTH STABILITY: MENTAL HEALTH: LITTLE INTEREST OR PLEASURE IN ACTIVITY?: NOT AT ALL

## 2023-03-16 ASSESSMENT — PATIENT HEALTH QUESTIONNAIRE - PHQ9: SUM OF ALL RESPONSES TO PHQ9 QUESTIONS 1 AND 2: 0

## 2023-11-10 ENCOUNTER — OFFICE VISIT (OUTPATIENT)
Dept: FAMILY MEDICINE CLINIC | Facility: CLINIC | Age: 48
End: 2023-11-10
Payer: COMMERCIAL

## 2023-11-10 VITALS
DIASTOLIC BLOOD PRESSURE: 78 MMHG | RESPIRATION RATE: 18 BRPM | SYSTOLIC BLOOD PRESSURE: 142 MMHG | HEIGHT: 67 IN | WEIGHT: 240 LBS | BODY MASS INDEX: 37.67 KG/M2 | HEART RATE: 78 BPM | OXYGEN SATURATION: 98 % | TEMPERATURE: 99 F

## 2023-11-10 DIAGNOSIS — J01.90 ACUTE RHINOSINUSITIS: Primary | ICD-10-CM

## 2023-11-10 DIAGNOSIS — R03.0 ELEVATED BLOOD PRESSURE READING: ICD-10-CM

## 2023-11-10 DIAGNOSIS — H66.002 NON-RECURRENT ACUTE SUPPURATIVE OTITIS MEDIA OF LEFT EAR WITHOUT SPONTANEOUS RUPTURE OF TYMPANIC MEMBRANE: ICD-10-CM

## 2023-11-10 DIAGNOSIS — R05.1 ACUTE COUGH: ICD-10-CM

## 2023-11-10 PROCEDURE — 99203 OFFICE O/P NEW LOW 30 MIN: CPT | Performed by: NURSE PRACTITIONER

## 2023-11-10 PROCEDURE — 3078F DIAST BP <80 MM HG: CPT | Performed by: NURSE PRACTITIONER

## 2023-11-10 PROCEDURE — 3077F SYST BP >= 140 MM HG: CPT | Performed by: NURSE PRACTITIONER

## 2023-11-10 PROCEDURE — 3008F BODY MASS INDEX DOCD: CPT | Performed by: NURSE PRACTITIONER

## 2023-11-10 RX ORDER — ALBUTEROL SULFATE 90 UG/1
2 AEROSOL, METERED RESPIRATORY (INHALATION) EVERY 6 HOURS PRN
Qty: 1 EACH | Refills: 0 | Status: SHIPPED | OUTPATIENT
Start: 2023-11-10

## 2023-11-10 RX ORDER — FLUTICASONE PROPIONATE 50 MCG
2 SPRAY, SUSPENSION (ML) NASAL DAILY
Qty: 1 EACH | Refills: 0 | Status: SHIPPED | OUTPATIENT
Start: 2023-11-10

## 2023-11-10 RX ORDER — AMOXICILLIN AND CLAVULANATE POTASSIUM 875; 125 MG/1; MG/1
1 TABLET, FILM COATED ORAL 2 TIMES DAILY
Qty: 20 TABLET | Refills: 0 | Status: SHIPPED | OUTPATIENT
Start: 2023-11-10 | End: 2023-11-20

## 2023-11-10 RX ORDER — AMLODIPINE BESYLATE 10 MG/1
10 TABLET ORAL DAILY
COMMUNITY

## 2023-11-10 NOTE — PATIENT INSTRUCTIONS
Push fluids  Mucinex DM for cough  Follow up with your doctor regarding your elevated blood pressure reading. Decongestants can raise your blood pressure  Follow up for new or worsening symptoms or if not improving over the next 4-5 days.

## 2023-11-30 RX ORDER — ALBUTEROL SULFATE 90 UG/1
2 AEROSOL, METERED RESPIRATORY (INHALATION) EVERY 6 HOURS PRN
Qty: 8.5 G | Refills: 0 | OUTPATIENT
Start: 2023-11-30

## 2024-01-02 ENCOUNTER — OFFICE VISIT (OUTPATIENT)
Dept: FAMILY MEDICINE CLINIC | Facility: CLINIC | Age: 49
End: 2024-01-02
Payer: COMMERCIAL

## 2024-01-02 VITALS
BODY MASS INDEX: 38.92 KG/M2 | SYSTOLIC BLOOD PRESSURE: 115 MMHG | WEIGHT: 248 LBS | TEMPERATURE: 98 F | DIASTOLIC BLOOD PRESSURE: 80 MMHG | RESPIRATION RATE: 16 BRPM | OXYGEN SATURATION: 99 % | HEART RATE: 89 BPM | HEIGHT: 67 IN

## 2024-01-02 DIAGNOSIS — U07.1 COVID-19: Primary | ICD-10-CM

## 2024-01-02 DIAGNOSIS — R05.1 ACUTE COUGH: ICD-10-CM

## 2024-01-02 LAB
OPERATOR ID: ABNORMAL
RAPID SARS-COV-2 BY PCR: DETECTED

## 2024-01-02 PROCEDURE — 99213 OFFICE O/P EST LOW 20 MIN: CPT

## 2024-01-02 PROCEDURE — 3074F SYST BP LT 130 MM HG: CPT

## 2024-01-02 PROCEDURE — U0002 COVID-19 LAB TEST NON-CDC: HCPCS

## 2024-01-02 PROCEDURE — 3079F DIAST BP 80-89 MM HG: CPT

## 2024-01-02 PROCEDURE — 3008F BODY MASS INDEX DOCD: CPT

## 2024-01-02 NOTE — PROGRESS NOTES
CHIEF COMPLAINT:     Chief Complaint   Patient presents with    Cough     Cough and congestion, x6 days        HPI:   Zeeshan Bearden is a 48 year old male who presents for upper respiratory symptoms for  6 days. Patient reports congestion, dry cough, denies fever. Symptoms have been without change since onset.  Treating symptoms with over the counter medications. Denies any known exposures or other members currently in house sick with similar symptoms. Reports history of COVID but states it has been over a year.      Current Outpatient Medications   Medication Sig Dispense Refill    amLODIPine 10 MG Oral Tab Take 1 tablet (10 mg total) by mouth daily.      metFORMIN 500 MG Oral Tab Take 1 tablet (500 mg total) by mouth daily with breakfast.      Metoprolol Tartrate 100 MG Oral Tab Take 1 tablet (100 mg total) by mouth daily.      ezetimibe (ZETIA) 10 MG Oral Tab Take 1 tablet (10 mg total) by mouth daily.      aspirin 81 MG Oral Tab Take 1 tablet (81 mg total) by mouth daily.      atorvastatin 20 MG Oral Tab Take 1 tablet (20 mg total) by mouth daily.      fluticasone propionate 50 MCG/ACT Nasal Suspension 2 sprays by Each Nare route daily. (Patient not taking: Reported on 1/2/2024) 1 each 0    albuterol (PROAIR HFA) 108 (90 Base) MCG/ACT Inhalation Aero Soln Inhale 2 puffs into the lungs every 6 (six) hours as needed for Wheezing. (Patient not taking: Reported on 1/2/2024) 1 each 0    Losartan Potassium 25 MG Oral Tab Take by mouth daily.        Past Medical History:   Diagnosis Date    Essential hypertension     Hyperlipidemia     Prediabetes       Past Surgical History:   Procedure Laterality Date    OTHER SURGICAL HISTORY  2011    triple bypass surgery         Social History     Socioeconomic History    Marital status:    Tobacco Use    Smoking status: Never     Passive exposure: Yes    Smokeless tobacco: Never    Tobacco comments:     wife smokes   Vaping Use    Vaping Use: Never used   Substance and  Sexual Activity    Alcohol use: No         REVIEW OF SYSTEMS:   GENERAL: no change in appetite  SKIN: no rashes or abnormal skin lesions  HEENT: See HPI  LUNGS: See HPI  CARDIOVASCULAR: denies chest pain or palpitations   GI: denies N/V/C or abdominal pain      EXAM:   /80   Pulse 89   Temp 98.1 °F (36.7 °C) (Oral)   Resp 16   Ht 5' 7\" (1.702 m)   Wt 248 lb (112.5 kg)   SpO2 99%   BMI 38.84 kg/m²   GENERAL: well developed, well nourished,in no apparent distress  SKIN: no rashes,no suspicious lesions  HEAD: atraumatic, normocephalic.  no tenderness on palpation of frontal and maxillary sinuses  EYES: conjunctiva clear, EOM intact  EARS: TM's pearly and intact, no bulging, no retraction,no fluid, bony landmarks visualized  NOSE: Nostrils patent, clear nasal discharge, nasal mucosa non-inflamed   THROAT: Oral mucosa pink, moist. Posterior pharynx is non erythematous. no exudates. Tonsils 1/4.    NECK: Supple, non-tender  LUNGS: clear to auscultation bilaterally, no wheezes or rhonchi. Breathing is non labored. No cough during exam  CARDIO: RRR without murmur  EXTREMITIES: no cyanosis, clubbing or edema  LYMPH:  no lymphadenopathy.        ASSESSMENT AND PLAN:   Zeeshan Bearden is a 48 year old male who presents with upper respiratory symptoms that are consistent with    ASSESSMENT:   Encounter Diagnoses   Name Primary?    Acute cough     COVID-19 Yes     Results for orders placed or performed in visit on 01/02/24   Rapid Covid-19    Collection Time: 01/02/24 10:22 AM    Specimen: Nares   Result Value Ref Range    Rapid SARS-CoV-2 by PCR Detected (A) Not Detected    POCT Lot Number [id number]     POCT Expiration Date 10/06/2025     POCT Procedure Control Control Valid Control Valid     ,098          PLAN: Meds as below.  Comfort care as described in Patient Instructions. Discussion about supportive treatment including over the counter medications, hydration and rest. Discussion about isolation  guidelines.    Meds & Refills for this Visit:  Requested Prescriptions      No prescriptions requested or ordered in this encounter     Risks, benefits, and side effects of medication explained and discussed.    The patient indicates understanding of these issues and agrees to the plan.  The patient is asked to f/u with PCP if sx's persist or worsen.

## 2024-01-02 NOTE — PATIENT INSTRUCTIONS
1. Drink plenty of fluids.  Get adequate rest.  Tylenol or ibuprofen for pain/fever.  2. The following over the counter treatments may be helpful.  Take according to package directions if no contraindication to use.      - Guaifenesin (mucinex) to thin mucus secretions;        - Saline nasal spray and Astepro allergy nasal spray to help relieve congestion      - Salt water gargles three times daily for sore throat.   3. Self quarantine at home per CDC recommendations.  4. Notify your primary care provider of your positive test result and of any new or worsening symptoms.  Go to the emergency department immediately if you develop difficulty breathing, wheezing, chest pain, or other concerning symptoms.       Please follow CDC guidelines listed below:     Continue to Quarantine (facemask, separate bathroom if possible, wipe everything down, handwashing, isolate to bedroom or separate floor in home if possible)     Wear a high-quality mask if you must be around others at home and in public.  Do not go places where you are unable to wear a mask.  Do not travel.  Stay home and separate from others as much as possible.  Use a separate bathroom, if possible.  Take steps to improve ventilation at home, if possible.  Don't share personal household items, like cups, towels, and utensils.  Monitor your symptoms. If you have an emergency warning sign (like trouble breathing), seek emergency medical care immediately.     If you tested positive and had symptoms, you may end isolation after day 5 and continue to wear a mask if:     You are fever-free for 24 hours (without the use of fever-reducing medication)  Your symptoms are improving  If you still have fever or your other symptoms have not improved, continue to isolate until they improve.     If you had?moderate illness?(if you experienced shortness of breath or had difficulty breathing), or?severe illness?(you were hospitalized) due to COVID-19, or you have a weakened immune  system, you need to isolate through day 10.  Consult your doctor before ending isolation.         Removing your mask:  After you have ended isolation, when you are feeling better (no fever without the use of fever-reducing medications and symptoms improving):      Wear your mask through day 10.     OR     If you have access to antigen tests, you should consider using them. With two sequential negative tests 48 hours apart, you may remove your mask sooner than day 10.  Note: If your antigen test results are positive, you may still be infectious. You should continue wearing a mask and wait at least 48 hours before taking another test. Continue taking antigen tests at least 48 hours apart until you have two sequential negative results. This may mean you need to continue wearing a mask and testing beyond day 10.     Regardless of when you end isolation, avoid being around people who are more likely to get very sick from COVID-19 until at least day 11. Remember to wear a high-quality mask when indoors around others at home and in public and not go places where you are unable to wear a mask until you are able to discontinue masking (see below), including public transportation and travel settings.       After you have ended isolation, if your COVID-19 symptoms recur or worsen, restart your isolation at day 0.

## 2024-01-05 ENCOUNTER — OFFICE VISIT (OUTPATIENT)
Dept: FAMILY MEDICINE CLINIC | Facility: CLINIC | Age: 49
End: 2024-01-05
Payer: COMMERCIAL

## 2024-01-05 VITALS
OXYGEN SATURATION: 99 % | TEMPERATURE: 97 F | SYSTOLIC BLOOD PRESSURE: 152 MMHG | DIASTOLIC BLOOD PRESSURE: 88 MMHG | BODY MASS INDEX: 38.92 KG/M2 | HEART RATE: 85 BPM | HEIGHT: 67 IN | WEIGHT: 248 LBS | RESPIRATION RATE: 20 BRPM

## 2024-01-05 DIAGNOSIS — H10.9 CONJUNCTIVITIS OF BOTH EYES, UNSPECIFIED CONJUNCTIVITIS TYPE: ICD-10-CM

## 2024-01-05 DIAGNOSIS — J01.40 ACUTE NON-RECURRENT PANSINUSITIS: Primary | ICD-10-CM

## 2024-01-05 PROCEDURE — 3077F SYST BP >= 140 MM HG: CPT | Performed by: NURSE PRACTITIONER

## 2024-01-05 PROCEDURE — 3008F BODY MASS INDEX DOCD: CPT | Performed by: NURSE PRACTITIONER

## 2024-01-05 PROCEDURE — 3079F DIAST BP 80-89 MM HG: CPT | Performed by: NURSE PRACTITIONER

## 2024-01-05 PROCEDURE — 99213 OFFICE O/P EST LOW 20 MIN: CPT | Performed by: NURSE PRACTITIONER

## 2024-01-05 RX ORDER — AMOXICILLIN AND CLAVULANATE POTASSIUM 875; 125 MG/1; MG/1
1 TABLET, FILM COATED ORAL 2 TIMES DAILY
Qty: 20 TABLET | Refills: 0 | Status: SHIPPED | OUTPATIENT
Start: 2024-01-05 | End: 2024-01-15

## 2024-01-05 RX ORDER — ERYTHROMYCIN 5 MG/G
1 OINTMENT OPHTHALMIC EVERY 6 HOURS
Qty: 1 EACH | Refills: 0 | Status: SHIPPED | OUTPATIENT
Start: 2024-01-05

## 2024-01-05 NOTE — PROGRESS NOTES
CHIEF COMPLAINT:     Chief Complaint   Patient presents with   • Covid     Pt feels the same as last visit, negative home test this morning     Eye discharge, productive cough, ears feel clogged, sinus pain - wants to rule out sinus infection        HPI:   Zeeshan Bearden is a 48 year old male who presents for sinus congestion for  8-9   days.  Seen for same last week and dx with Symptoms have been worsening since onset. Sinus congestion/pain is described as a pressure and is located mainly maxillary.  Reports mucoid nasal discharge.  Nothing makes symptoms better. Has treated symptoms with OTC meds.  Patient also reports headache, cough, fullness in ears, sore throat. Reports bilateral eye redness and drainage, crusted shut at night and in the morning. Reports post nasal drip.  Denies fever, dental pain, tinnitus, N/V/D.        Current Outpatient Medications   Medication Sig Dispense Refill   • amoxicillin clavulanate 875-125 MG Oral Tab Take 1 tablet by mouth 2 (two) times daily for 10 days. 20 tablet 0   • erythromycin 5 MG/GM Ophthalmic Ointment Place 1 Application into both eyes every 6 (six) hours. 1 each 0   • amLODIPine 10 MG Oral Tab Take 1 tablet (10 mg total) by mouth daily.     • metFORMIN 500 MG Oral Tab Take 1 tablet (500 mg total) by mouth daily with breakfast.     • Metoprolol Tartrate 100 MG Oral Tab Take 1 tablet (100 mg total) by mouth daily.     • ezetimibe (ZETIA) 10 MG Oral Tab Take 1 tablet (10 mg total) by mouth daily.     • aspirin 81 MG Oral Tab Take 1 tablet (81 mg total) by mouth daily.     • atorvastatin 20 MG Oral Tab Take 1 tablet (20 mg total) by mouth daily.     • fluticasone propionate 50 MCG/ACT Nasal Suspension 2 sprays by Each Nare route daily. (Patient not taking: Reported on 1/2/2024) 1 each 0   • albuterol (PROAIR HFA) 108 (90 Base) MCG/ACT Inhalation Aero Soln Inhale 2 puffs into the lungs every 6 (six) hours as needed for Wheezing. (Patient not taking: Reported on  1/2/2024) 1 each 0   • Losartan Potassium 25 MG Oral Tab Take by mouth daily.        Past Medical History:   Diagnosis Date   • Essential hypertension    • Hyperlipidemia    • Prediabetes       Past Surgical History:   Procedure Laterality Date   • OTHER SURGICAL HISTORY  2011    triple bypass surgery      No family history on file.   Social History     Socioeconomic History   • Marital status:    Tobacco Use   • Smoking status: Never     Passive exposure: Yes   • Smokeless tobacco: Never   • Tobacco comments:     wife smokes   Vaping Use   • Vaping Use: Never used   Substance and Sexual Activity   • Alcohol use: No         REVIEW OF SYSTEMS:   GENERAL: feels well otherwise, no unplanned weight change,  ok appetite  SKIN: no rashes or abnormal skin lesions  HEENT: See HPI.    LUNGS: denies shortness of breath or wheezing, See HPI  CARDIOVASCULAR: denies chest pain or palpitations   GI: denies N/V/C or abdominal pain  NEURO: + sinus headaches.  No numbness or tingling in face.    EXAM:   /88   Pulse 85   Temp 96.9 °F (36.1 °C) (Temporal)   Resp 20   Ht 5' 7\" (1.702 m)   Wt 248 lb (112.5 kg)   SpO2 99%   BMI 38.84 kg/m²   GENERAL: well developed, well nourished,in no apparent distress  SKIN: no rashes,no suspicious lesions  HEAD: atraumatic, normocephalic, + mild tenderness on palpation of maxillary sinuses  EYES: conjunctiva clear, EOM intact  EARS: TM's pearly, no bulging, no retraction, mild serous fluid, bony landmarks visualized  NOSE: nostrils patent, yellowish nasal mucous, nasal mucosa reddened and edematous  THROAT: oral mucosa pink, moist. No visible dental caries. Posterior pharynx is mildly erythematous. no exudates.  NECK: supple, non-tender  LUNGS: clear to auscultation bilaterally, no wheezes or rhonchi.  No crackles/rales, good air movement throughout.Breathing is non labored.  CARDIO: RRR without murmur  EXTREMITIES: no cyanosis, clubbing or edema  LYMPH:  No cervical  lymphadenopathy.        ASSESSMENT AND PLAN:     Zeeshan Bearden is a 48 year old male who presents with   ASSESSMENT:   Encounter Diagnoses   Name Primary?   • Acute non-recurrent pansinusitis Yes   • Conjunctivitis of both eyes, unspecified conjunctivitis type        PLAN: LIkely secondary sinus/conjuctivtis infection d/t Covid. Meds as below. Mucinex to help thin secretions.  Increase fluids and rest.  Comfort care as described in Patient Instructions.Follow up with PCP in 2-3 days if no improvement, sooner if worsening. If any difficulty breathing, SOB, or wheezing seek emergent care.       Meds & Refills for this Visit:  Requested Prescriptions     Signed Prescriptions Disp Refills   • amoxicillin clavulanate 875-125 MG Oral Tab 20 tablet 0     Sig: Take 1 tablet by mouth 2 (two) times daily for 10 days.   • erythromycin 5 MG/GM Ophthalmic Ointment 1 each 0     Sig: Place 1 Application into both eyes every 6 (six) hours.       Risks, benefits, and side effects of medication explained and discussed.    There are no Patient Instructions on file for this visit.    The patient indicates understanding of these issues and agrees to the plan.  The patient is asked to return if sx's persist or worsen.    Increase fluids, Motrin/Tylenol prn, rest.  Patient is to follow up if fever greater than or equal to 100.4 persists for 72 hours.

## (undated) NOTE — LETTER
Date: 1/2/2024    Patient Name: Zeeshan Bearden          To Whom it may concern:    This letter has been written at the patient's request. The above patient was seen at the [facility] for treatment of a medical condition.    This patient should be excused from attending work on 01/02/2024.    The patient may return to work starting on 01/03/2024 with the following limitations must wear a mask around others through 01/07/2024.        Sincerely,          CODY Arroyo